# Patient Record
Sex: MALE | NOT HISPANIC OR LATINO | URBAN - METROPOLITAN AREA
[De-identification: names, ages, dates, MRNs, and addresses within clinical notes are randomized per-mention and may not be internally consistent; named-entity substitution may affect disease eponyms.]

---
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
## 2025-01-26 NOTE — ED PROVIDER NOTE - CONSTITUTIONAL, MLM
Orthopedic Post Operative Visit:  Surgery: Right carpal tunnel release and subtotal flexor tenosynovectomy  Date of Surgery: 1/13/2025    HISTORY  The patient is here today for routine post op follow up. He is 11 days out from the above procedure.  He does like he is doing well today, he has been working with occupational therapy and HEP.  He does me that his nighttime symptoms have resolved entirely, he does have some residual numbness and tingling that is isolated to the fingertips.      OBJECTIVE  Physical Exam    Right Upper Extremity:   Incision/dressing: Healing without signs of infection including erythema, edema, open areas, and drainage.  Sutures are removed.  ROM Fingers and Wrist: He is able to make a complete fist and fully extend all digits  He does have thenar atrophy present, he is not able to oppose the thumb against resistance  Sensation to light touch intact   Fingers warm and well perfused    ASSESSMENT & PLAN  2 weeks out from the above procedures, doing well    Details of surgery were reviewed along with prognosis and expected recovery  Advised on scar massage continue movement, activity progression as tolerated  He has another appointment with therapy, can continue as needed    He is able to follow-up on an as-needed basis    Davy Gottlieb PA-C  01/26/25    The supervising physician for this encounter on this date and time is Chandra Pollard MD.      normal (ped)... In no apparent distress, appears well developed and well nourished. Appears comfortable